# Patient Record
Sex: FEMALE | Race: WHITE | Employment: FULL TIME | URBAN - METROPOLITAN AREA
[De-identification: names, ages, dates, MRNs, and addresses within clinical notes are randomized per-mention and may not be internally consistent; named-entity substitution may affect disease eponyms.]

---

## 2017-02-07 ENCOUNTER — APPOINTMENT (OUTPATIENT)
Dept: ULTRASOUND IMAGING | Facility: CLINIC | Age: 62
End: 2017-02-07
Attending: EMERGENCY MEDICINE
Payer: MEDICAID

## 2017-02-07 ENCOUNTER — HOSPITAL ENCOUNTER (EMERGENCY)
Facility: CLINIC | Age: 62
Discharge: HOME | End: 2017-02-07
Attending: EMERGENCY MEDICINE | Admitting: EMERGENCY MEDICINE
Payer: MEDICAID

## 2017-02-07 ENCOUNTER — APPOINTMENT (OUTPATIENT)
Dept: GENERAL RADIOLOGY | Facility: CLINIC | Age: 62
End: 2017-02-07
Attending: EMERGENCY MEDICINE
Payer: MEDICAID

## 2017-02-07 VITALS
RESPIRATION RATE: 14 BRPM | DIASTOLIC BLOOD PRESSURE: 125 MMHG | WEIGHT: 218 LBS | BODY MASS INDEX: 36.32 KG/M2 | HEART RATE: 68 BPM | OXYGEN SATURATION: 97 % | HEIGHT: 65 IN | SYSTOLIC BLOOD PRESSURE: 160 MMHG | TEMPERATURE: 98.2 F

## 2017-02-07 DIAGNOSIS — R07.9 RIGHT-SIDED CHEST PAIN: ICD-10-CM

## 2017-02-07 PROCEDURE — 99284 EMERGENCY DEPT VISIT MOD MDM: CPT | Performed by: EMERGENCY MEDICINE

## 2017-02-07 PROCEDURE — 71010 XR CHEST 1 VW: CPT | Mod: TC

## 2017-02-07 PROCEDURE — 76705 ECHO EXAM OF ABDOMEN: CPT

## 2017-02-07 PROCEDURE — 71101 X-RAY EXAM UNILAT RIBS/CHEST: CPT | Mod: TC,RT

## 2017-02-07 PROCEDURE — 99284 EMERGENCY DEPT VISIT MOD MDM: CPT | Mod: 25

## 2017-02-07 NOTE — ED PROVIDER NOTES
"  History     Chief Complaint   Patient presents with     Rib Pain     The history is provided by the patient and medical records.     This is a 61-year-old female, history of hypothyroidism, hypertension, hyperlipidemia, GERD presenting with rib pain.      Patient notes that she 1st started feeling pain in her right anterior ib cage 5 days ago.  It initially started out as a sharp pain that was quite intermittent.  However, by Sunday, 2 days ago, patient noted increased pain with any touch. She is having difficulty with letting her boyfriend hug her because the pain increased. Today the pain is a nagging ache but increases with movement or when going over the bumps on the way to the ED. She denies any recent trauma, overuse, lifting. She has had some cold symptoms with which she describes as a dry cough and wheeze. She smokes about a half a pack per day. Cough is nonproductive. She was on antibiotics in October for similar symptoms and had to have \"2 rounds before it got better\".  Patient denies any fevers, chills, nausea, vomiting, abdominal pain,bowel or bladder symptoms.  No skin changes noted lumps or other abnormalities over the area pain.  She is not on inhalers. She has a history of Sjogren syndrome and is not on medications for it. Over the last year she has felt that her legs have been quite heavy whenever she is going up and down stairs but she does acknowledge that she has not been as active as usual.    I have reviewed the Medications, Allergies, Past Medical and Surgical History, and Social History in the Epic system.    Review of Systems   All other ROS reviewed and are negative or non-contributory except as stated in HPI.    Physical Exam   BP: (!) 160/125 mmHg  Pulse: 68  Temp: 98.2  F (36.8  C)  Resp: 14  Height: 165.1 cm (5' 5\")  Weight: 98.884 kg (218 lb)  SpO2: 97 %  Physical Exam   Constitutional: She appears well-developed and well-nourished.   healthy-appearing female sitting on the bed. " Moves about easily.   HENT:   Head: Normocephalic.   Right Ear: External ear normal.   Left Ear: External ear normal.   Nose: Nose normal.   Mouth/Throat: Oropharynx is clear and moist.   Eyes: Conjunctivae and EOM are normal. Pupils are equal, round, and reactive to light. No scleral icterus.   Neck: Normal range of motion. Neck supple.   Cardiovascular: Normal rate, regular rhythm, normal heart sounds and intact distal pulses.    Pulmonary/Chest: Effort normal and breath sounds normal.       Abdominal: Soft. There is no tenderness.   no significant right upper quadrant tenderness   Lymphadenopathy:     She has no cervical adenopathy.   Skin: She is not diaphoretic.   Vitals reviewed.      ED Course (with Medical Decision Making)    Pt seen and examined by me.  RN and EPIC notes reviewed.      Patient with Pain in the right anteriorrib cage area. There is possibly a slight pleuritic component. Differential includes musculoskeletal, pleuritic secondary to PE, pneumonia, viral illness, etc.    I discussed the differential and some of the options to the patient. She is preferring for the least invasive so we are going to start with x-ray.    X-ray does not show any evidence for pneumonia or broken ribs.Results were discussed with the patient. We had discussed gallbladder previously.  We have elected to do a right upper quadrant ultrasound.    Ultrasound shows no acute abnormalities. Results relayed to the patient. Recommend rest, ice or heat to the painful area. Ibuprofen or Aleve for pain.  I would like her to follow up with her primary care provider in the next week.  Return for significant worsening, changes or concerns.  Patient declines any further pain medications at this time. She needs to have her blood pressure recheck in clinic.   Procedures  Results for orders placed or performed during the hospital encounter of 02/07/17   XR Chest 1 View    Narrative    XR CHEST 1 VW   2/7/2017 1:28 PM     HISTORY: right  chest pain    COMPARISON: None.      Impression    IMPRESSION: Lungs are clear. No pleural fluid.    CORNELIUS SANDOVAL MD   Ribs XR, unilat 3 views + PA chest, right    Narrative    XR RIBS & CHEST RT 3VW   2/7/2017 1:28 PM     HISTORY: right pain; cough    COMPARISON: Chest x-ray from 5/29/2006.      Impression    IMPRESSION: Heart size is normal. Pulmonary vasculature is normal.  Lungs are clear. No pleural fluid. No definite rib fractures.    CORNELIUS SANDOVAL MD   Abdomen US, limited (RUQ only)    Narrative    ULTRASOUND ABDOMEN LIMITED  2/7/2017 2:44 PM    HISTORY: Right upper quadrant pain.    COMPARISON: None.    FINDINGS:  Gallbladder: Normal.    Common bile duct: Normal.    Liver: Normal.    Pancreas: Pancreatic tail is partially obscured. Visualized pancreas  is normal.    Right kidney: Normal.    Proximal aorta and inferior vena cava appear normal.      Impression    IMPRESSION:   1. Pancreas not completely visualized.  2. Otherwise unremarkable right upper quadrant ultrasound.    ADRIAN KOLB MD        Assessments & Plan     I have reviewed the findings, diagnosis, plan and need for follow up with the patient.    Discharge Medication List as of 2/7/2017  3:24 PM          Final diagnoses:   Right-sided chest pain     Disposition: Patient discharged home in stable condition. Plan as above. Return for concerns.    Note: Chart documentation done in part with Dragon Voice Recognition software. Although reviewed after completion, some word and grammatical errors may remain.     2/7/2017   [facility] EMERGENCY DEPARTMENT      Orin Zheng MD  02/07/17 2142    Orin Zheng MD  02/07/17 7116

## 2017-02-07 NOTE — ED AVS SNAPSHOT
[facility] Emergency Department    [address]     [place] [address]    Phone:  [phone]    Fax:  [phone]                                       Ashley Calhoun   MRN: [id number]    Department:  [facility] Emergency Department   Date of Visit:  2/7/2017           Patient Information     Date Of Birth          1955        Your diagnoses for this visit were:     Right-sided chest pain        You were seen by Orin Zheng MD.      Follow-up Information     Schedule an appointment as soon as possible for a visit with Sabra Gamboa PA-C.    Specialty:  Physician Assistant    Contact information:    [facility]  [address] [facility] [address]  [phone]          Discharge Instructions       The ultrasound was unremarkable for any gallbladder disease. Chest x-rays were also clear.    Please make an appointment for follow-up with her primary care provider. Okay to use ibuprofen or Tylenol if needed for pain.    Return for any worsening, changes or concerns.    I hope this resolves soon!!    Discharge References/Attachments     CHEST PAIN, NONCARDIAC  (ENGLISH)      24 Hour Appointment Hotline       To make an appointment at any [facility], call [phone] ([phone]). If you don't have a family doctor or clinic, we will help you find one. [facility] clinics are conveniently located to serve the needs of you and your family.             Review of your medicines      Our records show that you are taking the medicines listed below. If these are incorrect, please call your family doctor or clinic.        Dose / Directions Last dose taken    ASPIRIN PO   Dose:  81 mg        Take 81 mg by mouth daily   Refills:  0        FLUOXETINE HCL PO   Dose:  80 mg %        Take 80 mg % by mouth   Refills:  0        HYDROCHLOROTHIAZIDE PO   Dose:  25 mg        Take 25 mg by mouth daily   Refills:  0        IBUPROFEN PO   Dose:  600 mg        Take 600 mg  "by mouth every 4 hours as needed for moderate pain   Refills:  0        LEVOTHROID PO   Dose:  112 mcg        Take 112 mcg by mouth daily   Refills:  0        LORAZEPAM PO   Dose:  5-10 mg        Take 5-10 mg by mouth At Bedtime   Refills:  0        OMEPRAZOLE PO   Dose:  20 mg        Take 20 mg by mouth every morning   Refills:  0        PROPRANOLOL HCL PO   Dose:  40 mg        Take 40 mg by mouth 3 times daily   Refills:  0        SIMVASTATIN PO   Dose:  40 mg        Take 40 mg by mouth At Bedtime   Refills:  0        WELLBUTRIN SR PO   Dose:  150 mg        Take 150 mg by mouth 2 times daily   Refills:  0                Procedures and tests performed during your visit     Abdomen US, limited (RUQ only)    Ribs XR, unilat 3 views + PA chest, right    XR Chest 1 View      Orders Needing Specimen Collection     None      Pending Results     Date and Time Order Name Status Description    2/7/2017 1412 Abdomen US, limited (RUQ only) Preliminary             Pending Culture Results     No orders found from 2/6/2017 to 2/8/2017.            Thank you for choosing [facility]       Thank you for choosing [facility] for your care. Our goal is always to provide you with excellent care. Hearing back from our patients is one way we can continue to improve our services. Please take a few minutes to complete the written survey that you may receive in the mail after you visit with us. Thank you!        [employer] Information     [employer] lets you send messages to your doctor, view your test results, renew your prescriptions, schedule appointments and more. To sign up, go to [email or web address] . Click on \"Log in\" on the left side of the screen, which will take you to the Welcome page. Then click on \"Sign up Now\" on the right side of the page.     You will be asked to enter the access code listed below, as well as some personal information. Please follow the directions to create your username and password.     Your access code is: " [email or web address]  Expires: 2017  3:24 PM     Your access code will  in 90 days. If you need help or a new code, please call your [place] clinic or [phone].        Care EveryWhere ID     This is your Care EveryWhere ID. This could be used by other organizations to access your [place] medical records  [id number]        After Visit Summary       This is your record. Keep this with you and show to your community pharmacist(s) and doctor(s) at your next visit.

## 2017-02-07 NOTE — ED AVS SNAPSHOT
[facility] Emergency Department    [address] [place] [address]    Phone:  [phone]    Fax:  [phone]                                       Ashlye Calhoun   MRN: [id number]    Department:  [facility] Emergency Department   Date of Visit:  2/7/2017           After Visit Summary Signature Page     I have received my discharge instructions, and my questions have been answered. I have discussed any challenges I see with this plan with the nurse or doctor.    ..........................................................................................................................................  Patient/Patient Representative Signature      ..........................................................................................................................................  Patient Representative Print Name and Relationship to Patient    ..................................................               ................................................  Date                                            Time    ..........................................................................................................................................  Reviewed by Signature/Title    ...................................................              ..............................................  Date                                                            Time

## 2017-02-07 NOTE — ED NOTES
Pt here with rt anterior rib cage pain, since Thursday. Has had sharp bouts of it with when people hugged her. But now pain is there continuously and it gets worse with going over bumps or when she turns. Has had a cold. Had cold in October that took two doses of meds to clear. Gets pneumonia easily. Is a smoker. Smokes 6 cigs a day.

## 2017-02-07 NOTE — DISCHARGE INSTRUCTIONS
The ultrasound was unremarkable for any gallbladder disease. Chest x-rays were also clear.    Please make an appointment for follow-up with her primary care provider. Okay to use ibuprofen or Tylenol if needed for pain.    Return for any worsening, changes or concerns.    I hope this resolves soon!!

## 2017-03-08 ENCOUNTER — HOSPITAL ENCOUNTER (EMERGENCY)
Facility: CLINIC | Age: 62
Discharge: HOME | End: 2017-03-08
Attending: FAMILY MEDICINE | Admitting: FAMILY MEDICINE
Payer: MEDICAID

## 2017-03-08 ENCOUNTER — APPOINTMENT (OUTPATIENT)
Dept: CT IMAGING | Facility: CLINIC | Age: 62
End: 2017-03-08
Attending: FAMILY MEDICINE
Payer: MEDICAID

## 2017-03-08 VITALS
TEMPERATURE: 97.6 F | SYSTOLIC BLOOD PRESSURE: 136 MMHG | OXYGEN SATURATION: 94 % | HEART RATE: 81 BPM | DIASTOLIC BLOOD PRESSURE: 76 MMHG | RESPIRATION RATE: 16 BRPM

## 2017-03-08 DIAGNOSIS — S06.0X0A CONCUSSION WITHOUT LOSS OF CONSCIOUSNESS, INITIAL ENCOUNTER: ICD-10-CM

## 2017-03-08 DIAGNOSIS — S00.83XA FACIAL CONTUSION, INITIAL ENCOUNTER: ICD-10-CM

## 2017-03-08 PROCEDURE — 99285 EMERGENCY DEPT VISIT HI MDM: CPT | Mod: 25

## 2017-03-08 PROCEDURE — 25000128 H RX IP 250 OP 636: Performed by: FAMILY MEDICINE

## 2017-03-08 PROCEDURE — 96374 THER/PROPH/DIAG INJ IV PUSH: CPT

## 2017-03-08 PROCEDURE — 96361 HYDRATE IV INFUSION ADD-ON: CPT

## 2017-03-08 PROCEDURE — 70450 CT HEAD/BRAIN W/O DYE: CPT

## 2017-03-08 PROCEDURE — 99284 EMERGENCY DEPT VISIT MOD MDM: CPT | Performed by: FAMILY MEDICINE

## 2017-03-08 RX ORDER — ONDANSETRON 4 MG/1
4 TABLET, ORALLY DISINTEGRATING ORAL EVERY 6 HOURS PRN
Qty: 4 TABLET | Refills: 0 | Status: SHIPPED | OUTPATIENT
Start: 2017-03-08 | End: 2017-03-08

## 2017-03-08 RX ORDER — ONDANSETRON 2 MG/ML
4 INJECTION INTRAMUSCULAR; INTRAVENOUS EVERY 30 MIN PRN
Status: DISCONTINUED | OUTPATIENT
Start: 2017-03-08 | End: 2017-03-08 | Stop reason: HOSPADM

## 2017-03-08 RX ORDER — LIDOCAINE 40 MG/G
CREAM TOPICAL
Status: DISCONTINUED | OUTPATIENT
Start: 2017-03-08 | End: 2017-03-08 | Stop reason: HOSPADM

## 2017-03-08 RX ADMIN — SODIUM CHLORIDE 1000 ML: 9 INJECTION, SOLUTION INTRAVENOUS at 11:32

## 2017-03-08 RX ADMIN — ONDANSETRON HYDROCHLORIDE 4 MG: 2 SOLUTION INTRAMUSCULAR; INTRAVENOUS at 11:32

## 2017-03-08 NOTE — ED AVS SNAPSHOT
[facility] Emergency Department    [address] [place] [address]    Phone:  [phone]    Fax:  [phone]                                       Ashley Calhoun   MRN: [id number]    Department:  [facility] Emergency Department   Date of Visit:  3/8/2017           After Visit Summary Signature Page     I have received my discharge instructions, and my questions have been answered. I have discussed any challenges I see with this plan with the nurse or doctor.    ..........................................................................................................................................  Patient/Patient Representative Signature      ..........................................................................................................................................  Patient Representative Print Name and Relationship to Patient    ..................................................               ................................................  Date                                            Time    ..........................................................................................................................................  Reviewed by Signature/Title    ...................................................              ..............................................  Date                                                            Time

## 2017-03-08 NOTE — DISCHARGE INSTRUCTIONS
Thank you for giving us the opportunity to see you. The impression is that you had blunt left sided facial contusion, and a concussion.    You also have contusion to the left side of your shoulder and knee.    Please see the handouts below.  Ice all sore areas.    Take ibuprofen/Tylenol as needed for pain.  Add Zofran every 6 hours as needed for nausea.    Avoid any strenuous activity, and begin cognitive rest.    After discharge, please closely monitor for any new or worsening symptoms. Return to the Emergency Department at any time if your symptoms worsen.

## 2017-03-08 NOTE — ED AVS SNAPSHOT
[facility] Emergency Department    [address]     [place] [address]    Phone:  [phone]    Fax:  [phone]                                       Ashley Calhoun   MRN: [id number]    Department:  [facility] Emergency Department   Date of Visit:  3/8/2017           Patient Information     Date Of Birth          1955        Your diagnoses for this visit were:     Left facial contusion     Concussion without loss of consciousness        You were seen by Landon Villatoro MD.      Follow-up Information     Follow up with Sabra Gamboa PA-C In 1 week.    Specialty:  Physician Assistant    Contact information:    [facility]  [address] [facility] [address]  [phone]          Discharge Instructions       Thank you for giving us the opportunity to see you. The impression is that you had blunt left sided facial contusion, and a concussion.    You also have contusion to the left side of your shoulder and knee.    Please see the handouts below.  Ice all sore areas.    Take ibuprofen/Tylenol as needed for pain.  Add Zofran every 6 hours as needed for nausea.    Avoid any strenuous activity, and begin cognitive rest.    After discharge, please closely monitor for any new or worsening symptoms. Return to the Emergency Department at any time if your symptoms worsen.        24 Hour Appointment Hotline       To make an appointment at any [facility], call [phone] ([phone]). If you don't have a family doctor or clinic, we will help you find one. [facility] clinics are conveniently located to serve the needs of you and your family.             Review of your medicines      Our records show that you are taking the medicines listed below. If these are incorrect, please call your family doctor or clinic.        Dose / Directions Last dose taken    ASPIRIN PO   Dose:  81 mg        Take 81 mg by mouth daily   Refills:  0        FLUOXETINE HCL PO   Dose:  80 mg  "%        Take 80 mg % by mouth   Refills:  0        HYDROCHLOROTHIAZIDE PO   Dose:  25 mg        Take 25 mg by mouth daily   Refills:  0        IBUPROFEN PO   Dose:  600 mg        Take 600 mg by mouth every 4 hours as needed for moderate pain   Refills:  0        LEVOTHROID PO   Dose:  112 mcg        Take 112 mcg by mouth daily   Refills:  0        LORAZEPAM PO   Dose:  5-10 mg        Take 5-10 mg by mouth At Bedtime   Refills:  0        OMEPRAZOLE PO   Dose:  20 mg        Take 20 mg by mouth every morning   Refills:  0        PROPRANOLOL HCL PO   Dose:  40 mg        Take 40 mg by mouth 3 times daily   Refills:  0        SIMVASTATIN PO   Dose:  40 mg        Take 40 mg by mouth At Bedtime   Refills:  0        WELLBUTRIN SR PO   Dose:  150 mg        Take 150 mg by mouth 2 times daily   Refills:  0                Procedures and tests performed during your visit     Head CT w/o contrast    Peripheral IV catheter      Orders Needing Specimen Collection     None      Pending Results     Date and Time Order Name Status Description    3/8/2017 1042 Head CT w/o contrast Preliminary             Pending Culture Results     No orders found from 3/6/2017 to 3/9/2017.            Thank you for choosing [facility]       Thank you for choosing [facility] for your care. Our goal is always to provide you with excellent care. Hearing back from our patients is one way we can continue to improve our services. Please take a few minutes to complete the written survey that you may receive in the mail after you visit with us. Thank you!        [employer] Information     [employer] lets you send messages to your doctor, view your test results, renew your prescriptions, schedule appointments and more. To sign up, go to [email or web address] . Click on \"Log in\" on the left side of the screen, which will take you to the Welcome page. Then click on \"Sign up Now\" on the right side of the page.     You will be asked to enter the access code listed below, " as well as some personal information. Please follow the directions to create your username and password.     Your access code is: [email or web address]  Expires: 2017  3:24 PM     Your access code will  in 90 days. If you need help or a new code, please call your [place] clinic or [phone].        Care EveryWhere ID     This is your Care EveryWhere ID. This could be used by other organizations to access your [place] medical records  [id number]        After Visit Summary       This is your record. Keep this with you and show to your community pharmacist(s) and doctor(s) at your next visit.

## 2017-03-08 NOTE — ED PROVIDER NOTES
[facility] ED Provider Note   CC:     Chief Complaint   Patient presents with     Fall     HPI:  Ashley Calhoun is a 61 year old female who presented to the emergency department with acute injury to the left side of her face that occurred about 8:30 this morning.  Patient had just taken her dog for a walk to the mailbox.  She was trying to household her dog back to the house, and she slipped and fell.  She struck the left side of her face 1st and then the left shoulder and left knee.  She had no loss of consciousness, but states that this was the hardest that she has never fallen.  She has diffuse pain over the left side of her body, but denies any significant headache, neck pain, numbness, tingling, chest pain, shortness of breath.  She has been nauseated since this happened and she is feeling extremely sleepy and groggy.  Patient was dropped off by friends.  She lives with her best friend and her .  Patient denies any prior history of concussion.  She has no visual disturbance, ringing in the ears, drainage, dental trauma, nosebleed or nasal drainage, etc.    Problem List:  There are no active problems to display for this patient.      MEDS:   Previous Medications    ASPIRIN PO    Take 81 mg by mouth daily    BUPROPION HCL (WELLBUTRIN SR PO)    Take 150 mg by mouth 2 times daily    FLUOXETINE HCL PO    Take 80 mg % by mouth    HYDROCHLOROTHIAZIDE PO    Take 25 mg by mouth daily    IBUPROFEN PO    Take 600 mg by mouth every 4 hours as needed for moderate pain    LEVOTHYROXINE SODIUM (LEVOTHROID PO)    Take 112 mcg by mouth daily    LORAZEPAM PO    Take 5-10 mg by mouth At Bedtime    OMEPRAZOLE PO    Take 20 mg by mouth every morning    PROPRANOLOL HCL PO    Take 40 mg by mouth 3 times daily    SIMVASTATIN PO    Take 40 mg by mouth At Bedtime       ALLERGIES:  No Known Allergies    Past medical, surgical, and social histories, triage and  nursing notes were all reviewed.    Review of Systems   All other systems were reviewed and are negative    Physical Exam     Vitals were reviewed  Patient Vitals for the past 8 hrs:   BP Temp Temp src Pulse Resp SpO2   03/08/17 1027 126/85 98.1  F (36.7  C) Temporal 60 12 97 %     GENERAL APPEARANCE: Alert and oriented ×3.  Mild to moderate distress due to reported dizziness.  GCS 15  FACE: Left sided facial abrasion and contusion.  No crepitus or instability; normal TMJ and normal occlusion of the teeth.  EYES: Pupils are equal; extraocular muscles are intact  HENT: normal external exam  NECK: no adenopathy or asymmetry  RESP: normal respiratory effort; clear breath sounds bilaterally  CV: regular rate and rhythm; no significant murmurs, gallops or rubs  ABD: soft, no tenderness; no rebound or guarding; bowel sounds are normal  MS: no gross deformities noted; normal muscle tone.  EXT: No calf tenderness or pitting edema  SKIN: no worrisome rash  NEURO: no facial droop; no focal deficits, speech is normal        Available Lab/Imaging Results     Results for orders placed or performed during the hospital encounter of 03/08/17 (from the past 24 hour(s))   Head CT w/o contrast    Narrative    CT SCAN OF THE HEAD WITHOUT CONTRAST   3/8/2017 11:25 AM     HISTORY:  Left facial injury; fatigue, weakness, nausea.    TECHNIQUE:  Axial images of the head and coronal reformations without  IV contrast material.  Radiation dose for this scan was reduced using  automated exposure control, adjustment of the mA and/or kV according  to patient size, or iterative reconstruction technique.    COMPARISON: None.    FINDINGS:  The ventricles are normal in size, shape and configuration.   The brain parenchyma and subarachnoid spaces are normal.  There is no  evidence of intracranial hemorrhage, mass, acute infarct or anomaly.     The visualized portions of the sinuses and mastoids appear normal.   There is no evidence of trauma.       Impression    IMPRESSION:  Normal CT scan of the head.         Impression     Final diagnoses:   Left facial contusion   Concussion without loss of consciousness       ED Course & Medical Decision Making   Ashley Calhoun is a 61 year old female who presented to the emergency department with acute blunt facial trauma to the left side of the face, associated with dizziness, mild headache, nausea, and difficulty staying awake.  Patient reported to the injury occurred 2 hours prior to arrival.  The patient is a normal neurologic exam.  Patient has a abrasion/contusion to the left side of face but no other abnormal findings.  Because of her symptoms, patient underwent a CT scan of the head and this reveals no evidence for intracranial hemorrhage, mass, or acute infarct.  Patient was given IV fluids and Zofran with improvement in her symptoms.  Patient likely has a concussion and she was advised to begin cognitive rest.  Drink plenty of fluids and rest.  Avoid activities that could result in another head injury.  Continue Zofran as needed for nausea.  Follow up in the clinic in 5-7 days for recheck.  Return to the ED at any time if symptoms worsen.  Ice all sore areas      Written after-visit summary and instructions were given at the time of discharge.      New Prescriptions    No medications on file         This note was completed in part using Dragon voice recognition, and may contain word and grammatical errors.        Landon Villatoro MD  03/08/17 4738

## 2017-03-08 NOTE — ED NOTES
Pt fell outside on sidewalk this AM on to her left face and now complaints of dizziness, and headache and sleepiness. Denies that she lost consciousness.

## 2017-04-10 ENCOUNTER — TRANSFERRED RECORDS (OUTPATIENT)
Dept: HEALTH INFORMATION MANAGEMENT | Facility: CLINIC | Age: 62
End: 2017-04-10

## 2017-04-17 ENCOUNTER — TRANSFERRED RECORDS (OUTPATIENT)
Dept: HEALTH INFORMATION MANAGEMENT | Facility: CLINIC | Age: 62
End: 2017-04-17

## 2017-05-03 ENCOUNTER — HOSPITAL ENCOUNTER (OUTPATIENT)
Dept: MRI IMAGING | Facility: CLINIC | Age: 62
End: 2017-05-03
Attending: PSYCHIATRY & NEUROLOGY
Payer: MEDICARE

## 2017-05-03 ENCOUNTER — HOSPITAL ENCOUNTER (OUTPATIENT)
Dept: MRI IMAGING | Facility: CLINIC | Age: 62
Discharge: HOME | End: 2017-05-03
Attending: PSYCHIATRY & NEUROLOGY | Admitting: PSYCHIATRY & NEUROLOGY
Payer: MEDICARE

## 2017-05-03 DIAGNOSIS — R93.89 ABNORMAL FINDING ON IMAGING: ICD-10-CM

## 2017-05-03 DIAGNOSIS — R29.2 HYPERREFLEXIA: ICD-10-CM

## 2017-05-03 DIAGNOSIS — R26.9 GAIT DIFFICULTY: ICD-10-CM

## 2017-05-03 PROCEDURE — 72158 MRI LUMBAR SPINE W/O & W/DYE: CPT

## 2017-05-03 PROCEDURE — A9585 GADOBUTROL INJECTION: HCPCS | Performed by: RADIOLOGY

## 2017-05-03 PROCEDURE — 70553 MRI BRAIN STEM W/O & W/DYE: CPT

## 2017-05-03 PROCEDURE — 25500064 ZZH RX 255 OP 636: Performed by: RADIOLOGY

## 2017-05-03 RX ORDER — GADOBUTROL 604.72 MG/ML
10 INJECTION INTRAVENOUS ONCE
Status: COMPLETED | OUTPATIENT
Start: 2017-05-03 | End: 2017-05-03

## 2017-05-03 RX ADMIN — GADOBUTROL 10 ML: 604.72 INJECTION INTRAVENOUS at 10:33

## 2017-05-05 ENCOUNTER — HOSPITAL ENCOUNTER (EMERGENCY)
Facility: CLINIC | Age: 62
Discharge: HOME | End: 2017-05-05
Attending: FAMILY MEDICINE | Admitting: FAMILY MEDICINE
Payer: MEDICARE

## 2017-05-05 ENCOUNTER — APPOINTMENT (OUTPATIENT)
Dept: CT IMAGING | Facility: CLINIC | Age: 62
End: 2017-05-05
Attending: FAMILY MEDICINE
Payer: MEDICARE

## 2017-05-05 VITALS
DIASTOLIC BLOOD PRESSURE: 49 MMHG | WEIGHT: 215 LBS | HEART RATE: 58 BPM | OXYGEN SATURATION: 97 % | TEMPERATURE: 98.6 F | RESPIRATION RATE: 18 BRPM | SYSTOLIC BLOOD PRESSURE: 114 MMHG | BODY MASS INDEX: 35.82 KG/M2 | HEIGHT: 65 IN

## 2017-05-05 DIAGNOSIS — M25.552 HIP PAIN, LEFT: Primary | ICD-10-CM

## 2017-05-05 PROCEDURE — 99284 EMERGENCY DEPT VISIT MOD MDM: CPT | Mod: 25 | Performed by: FAMILY MEDICINE

## 2017-05-05 PROCEDURE — 96372 THER/PROPH/DIAG INJ SC/IM: CPT

## 2017-05-05 PROCEDURE — 72192 CT PELVIS W/O DYE: CPT

## 2017-05-05 PROCEDURE — 99284 EMERGENCY DEPT VISIT MOD MDM: CPT | Mod: 25

## 2017-05-05 PROCEDURE — 25000128 H RX IP 250 OP 636: Performed by: FAMILY MEDICINE

## 2017-05-05 RX ORDER — OXYCODONE HYDROCHLORIDE 5 MG/1
5 TABLET ORAL EVERY 6 HOURS PRN
Qty: 20 TABLET | Refills: 0 | Status: SHIPPED | OUTPATIENT
Start: 2017-05-05

## 2017-05-05 RX ORDER — OXYCODONE HYDROCHLORIDE 5 MG/1
5 TABLET ORAL EVERY 6 HOURS PRN
Qty: 10 TABLET | Refills: 0 | Status: SHIPPED | OUTPATIENT
Start: 2017-05-05

## 2017-05-05 RX ADMIN — HYDROMORPHONE HYDROCHLORIDE 1 MG: 1 INJECTION, SOLUTION INTRAMUSCULAR; INTRAVENOUS; SUBCUTANEOUS at 11:33

## 2017-05-05 ASSESSMENT — ENCOUNTER SYMPTOMS
WEAKNESS: 0
CHILLS: 0
NAUSEA: 0
DIARRHEA: 0
CONSTIPATION: 0
DIZZINESS: 0
COUGH: 0
WOUND: 0
LIGHT-HEADEDNESS: 0
SHORTNESS OF BREATH: 0
BACK PAIN: 0
ARTHRALGIAS: 1
FEVER: 0
ACTIVITY CHANGE: 1
VOMITING: 0
DIFFICULTY URINATING: 0
MYALGIAS: 0
JOINT SWELLING: 0

## 2017-05-05 NOTE — ED AVS SNAPSHOT
[facility] Emergency Department    [address]     [place] [address]    Phone:  [phone]    Fax:  [phone]                                       Ashley Calhoun   MRN: [id number]    Department:  [facility] Emergency Department   Date of Visit:  5/5/2017           Patient Information     Date Of Birth          1955        Your diagnoses for this visit were:     Hip pain, left        You were seen by Kasi Grullon MD.      Follow-up Information     Schedule an appointment as soon as possible for a visit with Sabra Gamboa PA-C.    Specialty:  Physician Assistant    Why:  As needed    Contact information:    [facility]  [address] [facility] [address]  [phone]          Discharge Instructions         Left Hip Pain    Arthralgia is the term for pain in or around the joint. It is a symptom, not a disease. This pain may involve one or more joints. In some cases, the pain moves from joint to joint.  There are many causes for joint pain. These include:    Injury    Osteoarthritis (wearing out of the joint surface)    Gout (inflammation of the joint due to crystals in the joint fluid)    Infection inside the joint      Bursitis (inflammation of the fluid-filled sacs around the joint)    Autoimmune disorders such as rheumatoid arthritis or lupus    Tendonitis (inflamation of chords that attach muscle to bone)  Home care    Rest the involved joint(s) until your symptoms improve.     I will send you home with a prescription for oxycodone. Please use this carefully and do not drive for six hours after taking this medicine.  Follow up  Follow up with Radiology for an injection of the left hip. You can call them at [phone]  Follow up with Physical Therapy as well. You can talk to them at your next appointment, and let them know that there is a referral in the computer.  When to seek medical care  Contact your healthcare provider right away if any of the  following occurs:    Pain, swelling, or redness of joint increases    Pain worsens or recurs after a period of improvement    Pain moves to other joints    You cannot bear weight on the affected joint     You cannot move the affected joint    Joint appears deformed    New rash appears    Fever of 101 F (38.8 C) or higher, or as directed by your healthcare provider      Thank you for choosing our Emergency Department for your care.     Sincerely,    Dr Max Grullon M.D.          Future Appointments        Provider Department Dept Phone Center    5/8/2017 10:50 AM Atif Nobles PA-C [facility] [phone] [facility]    5/17/2017  1:30 PM Linda Ervin OT [facility] Occupational Therapy [phone] [facility]    5/17/2017  2:30 PM Lucero Carpio, PT [facility] Physical Therapy [phone] [facility]      24 Hour Appointment Hotline       To make an appointment at any [facility]3], call [phone] ([phone]). If you don't have a family doctor or clinic, we will help you find one. [facility] are conveniently located to serve the needs of you and your family.          ED Discharge Orders     IR REFERRAL       [facility] IR REFERRAL  Call [phone] to schedule.            PHYSICAL THERAPY REFERRAL       *This therapy referral will be filtered to a centralized scheduling office at [facility] and the patient will receive a call to schedule an appointment at a [place] location most convenient for them. *     [facility] provides Physical Therapy evaluation and treatment and many specialty services across the [place] system.  If requesting a specialty program, please choose from the list below.    If you have not heard from the scheduling office within 2 business days, please call [phone] for all locations, with the exception of [place], please call [phone].  Treatment: Evaluation & Treatment  Special  Instructions/Modalities: left hip pain  Special Programs: None    Please be aware that coverage of these services is subject to the terms and limitations of your health insurance plan.  Call member services at your health plan with any benefit or coverage questions.      **Note to Provider:  If you are referring outside of [place] for the therapy appointment, please list the name of the location in the  special instructions  above, print the referral and give to the patient to schedule the appointment.                     Review of your medicines      START taking        Dose / Directions Last dose taken    oxyCODONE 5 MG IR tablet   Commonly known as:  ROXICODONE   Dose:  5 mg   Quantity:  20 tablet        Take 1 tablet (5 mg) by mouth every 6 hours as needed for pain   Refills:  0          Our records show that you are taking the medicines listed below. If these are incorrect, please call your family doctor or clinic.        Dose / Directions Last dose taken    ASPIRIN PO   Dose:  81 mg        Take 81 mg by mouth daily   Refills:  0        FLUOXETINE HCL PO   Dose:  80 mg %        Take 80 mg % by mouth   Refills:  0        HYDROCHLOROTHIAZIDE PO   Dose:  25 mg        Take 25 mg by mouth daily   Refills:  0        IBUPROFEN PO   Dose:  600 mg        Take 600 mg by mouth every 4 hours as needed for moderate pain   Refills:  0        LEVOTHROID PO   Dose:  112 mcg        Take 112 mcg by mouth daily   Refills:  0        LORAZEPAM PO   Dose:  5-10 mg        Take 5-10 mg by mouth At Bedtime   Refills:  0        OMEPRAZOLE PO   Dose:  20 mg        Take 20 mg by mouth every morning   Refills:  0        PROPRANOLOL HCL PO   Dose:  40 mg        Take 40 mg by mouth 3 times daily   Refills:  0        SIMVASTATIN PO   Dose:  40 mg        Take 40 mg by mouth At Bedtime   Refills:  0        WELLBUTRIN SR PO   Dose:  150 mg        Take 150 mg by mouth 2 times daily   Refills:  0                Prescriptions were sent or printed  "at these locations (1 Prescription)                   [id number] [place] [place] [facility] [address]    [address] , [place] [address]    Telephone:  [phone]   Fax:  [phone]   Hours:                  Printed at Department/Unit printer (1 of 1)         oxyCODONE (ROXICODONE) 5 MG IR tablet                Procedures and tests performed during your visit     CT Pelvis w/o Contrast      Orders Needing Specimen Collection     None      Pending Results     No orders found from 5/3/2017 to 5/6/2017.            Pending Culture Results     No orders found from 5/3/2017 to 5/6/2017.            Pending Results Instructions     If you had any lab results that were not finalized at the time of your Discharge, you can call the ED Lab Result RN at [phone]. You will be contacted by this team for any positive Lab results or changes in treatment. The nurses are available 7 days a week from 10A to 6:30P.  You can leave a message 24 hours per day and they will return your call.        Thank you for choosing [facility]       Thank you for choosing [facility] for your care. Our goal is always to provide you with excellent care. Hearing back from our patients is one way we can continue to improve our services. Please take a few minutes to complete the written survey that you may receive in the mail after you visit with us. Thank you!        [employer] Information     [employer] lets you send messages to your doctor, view your test results, renew your prescriptions, schedule appointments and more. To sign up, go to [email or web address] . Click on \"Log in\" on the left side of the screen, which will take you to the Welcome page. Then click on \"Sign up Now\" on the right side of the page.     You will be asked to enter the access code listed below, as well as some personal information. Please follow the directions to create your username and password.     Your access code is: [email or web address]  Expires: 5/8/2017  4:24 PM   "   Your access code will  in 90 days. If you need help or a new code, please call your [facility] clinic or [phone].        Care EveryWhere ID     This is your Care EveryWhere ID. This could be used by other organizations to access your [facility] medical records  [id number]        After Visit Summary       This is your record. Keep this with you and show to your community pharmacist(s) and doctor(s) at your next visit.

## 2017-05-05 NOTE — PROGRESS NOTES
Pt requested to get up to the bathroom.  She was able to walk to the bathroom and void.  However, when trying to get back from the bathroom patient had excruciating pain.  Pt put on yonny steady and was moved back to her ED cart on that. Once she was back in bed the pain did decrease some.  Pt is requesting pain meds. Doctor informed.

## 2017-05-05 NOTE — ED AVS SNAPSHOT
[facility] Emergency Department    [address] [place] [address]    Phone:  [phone]    Fax:  [phone]                                       Ashley Calhoun   MRN: [id number]    Department:  [facility] Emergency Department   Date of Visit:  5/5/2017           After Visit Summary Signature Page     I have received my discharge instructions, and my questions have been answered. I have discussed any challenges I see with this plan with the nurse or doctor.    ..........................................................................................................................................  Patient/Patient Representative Signature      ..........................................................................................................................................  Patient Representative Print Name and Relationship to Patient    ..................................................               ................................................  Date                                            Time    ..........................................................................................................................................  Reviewed by Signature/Title    ...................................................              ..............................................  Date                                                            Time

## 2017-05-05 NOTE — ED NOTES
Pt comes in with complaints of L groin and L back pain that started a couple weeks ago. Pt denies trauma/injury. Pt also states a couple weeks ago she noticed a large hematoma on her L side from unknown cause.

## 2017-05-05 NOTE — DISCHARGE INSTRUCTIONS
Left Hip Pain    Arthralgia is the term for pain in or around the joint. It is a symptom, not a disease. This pain may involve one or more joints. In some cases, the pain moves from joint to joint.  There are many causes for joint pain. These include:    Injury    Osteoarthritis (wearing out of the joint surface)    Gout (inflammation of the joint due to crystals in the joint fluid)    Infection inside the joint      Bursitis (inflammation of the fluid-filled sacs around the joint)    Autoimmune disorders such as rheumatoid arthritis or lupus    Tendonitis (inflamation of chords that attach muscle to bone)  Home care    Rest the involved joint(s) until your symptoms improve.     I will send you home with a prescription for oxycodone. Please use this carefully and do not drive for six hours after taking this medicine.  Follow up  Follow up with Radiology for an injection of the left hip. You can call them at [phone]  Follow up with Physical Therapy as well. You can talk to them at your next appointment, and let them know that there is a referral in the computer.  When to seek medical care  Contact your healthcare provider right away if any of the following occurs:    Pain, swelling, or redness of joint increases    Pain worsens or recurs after a period of improvement    Pain moves to other joints    You cannot bear weight on the affected joint     You cannot move the affected joint    Joint appears deformed    New rash appears    Fever of 101 F (38.8 C) or higher, or as directed by your healthcare provider      Thank you for choosing our Emergency Department for your care.     Sincerely,    Dr Max Grullon M.D.

## 2017-05-05 NOTE — ED PROVIDER NOTES
History     Chief Complaint   Patient presents with     Back Pain     The history is provided by the patient and a significant other.     Ashley Calhoun is a 61 year old female who iis here with left groin pain.  She's had this pain for quite a while, since a fall on March 8th.  She has radiation of the pain into the back of the left pelvis and had a bruise on the left side of her leg.  The pain seems to come and go.  Today there is still pain in the left side of her groin.  It seems to be made worse by bending over or by movement of her hip.    She was seen in the emergency department and treated or concussion.  She has since been to [facility] for evaluation of hyperreflexia and gait difficulty.    I have reviewed the Medications, Allergies, Past Medical and Surgical History, and Social History in the Epic system.    She was seen by [facility] April 17 and the impression was gait difficulty and hyperreflexia.  Their note was reviewed in the chart.  They ordered an MRI for her.  She had a lumbar MRI on May 3rd:  IMPRESSION:  1. Advanced degenerative disc disease throughout the lumbar spine with discogenic endplate reactive marrow edema noted at L4-L5 and L2-L3.  2. Advanced apophyseal joint degenerative arthrosis in the lower lumbar spine. This is associated with mild periarticular reactive marrow edema on the right at L4-L5.  3. Marked prominence of the epidural fat or epidural lipomatosis at and below the L4-L5 level with resultant tapering of the thecal sac.  4. Mild central stenosis is also noted at L2-L3 and L3-L4.  5. Multilevel foraminal stenosis, greatest on the left at L5-S1 and bilaterally at L4-L5.    Review of Systems   Constitutional: Positive for activity change. Negative for chills and fever.   Respiratory: Negative for cough and shortness of breath.    Gastrointestinal: Negative for constipation, diarrhea, nausea and vomiting.   Genitourinary: Negative  "for decreased urine volume and difficulty urinating.   Musculoskeletal: Positive for arthralgias and gait problem. Negative for back pain, joint swelling and myalgias.   Skin: Negative for rash and wound.   Neurological: Negative for dizziness, weakness and light-headedness.   All other systems reviewed and are negative.      Physical Exam   BP: 134/85  Pulse: 66  Temp: 98.6  F (37  C)  Resp: 16  Height: 165.1 cm (5' 5\")  Weight: 97.5 kg (215 lb)  SpO2: 99 %    Physical Exam   Constitutional: She is oriented to person, place, and time. She appears well-developed and well-nourished. She appears distressed.   HENT:   Head: Normocephalic and atraumatic.   Eyes: Conjunctivae and EOM are normal.   Neck: Normal range of motion. Neck supple.   Cardiovascular: Normal rate, regular rhythm, normal heart sounds and intact distal pulses.    No murmur heard.  Pulmonary/Chest: Effort normal and breath sounds normal. No respiratory distress. She has no wheezes. She has no rales.   Abdominal: Soft. Bowel sounds are normal. She exhibits no distension. There is no tenderness.   Palpation in the LLQ does not produce pain   Musculoskeletal:   stoney minimal movement of the left hip produces pain.  The patient has pain located right at the joint in the hip, but palpation of the area does not produce tenderness like movement of the hip.   Neurological: She is alert and oriented to person, place, and time.   Skin: Skin is warm and dry. No rash noted. No erythema.   Psychiatric: She has a normal mood and affect. Her behavior is normal.   Nursing note and vitals reviewed.      ED Course     ED Course     Procedures    Results for orders placed or performed during the hospital encounter of 05/05/17 (from the past 24 hour(s))   CT Pelvis w/o Contrast    Narrative    CT PELVIS WITHOUT CONTRAST  5/5/2017 10:32 AM     HISTORY: Left hip and pelvis pain.  History of fall.    TECHNIQUE:  Axial images with reconstructions. No IV contrast.  Radiation " dose for this scan was reduced using automated exposure  control, adjustment of the mA and/or kV according to patient size, or  iterative reconstruction technique.    COMPARISON:  None    FINDINGS:  Degenerative change of the lower lumbar spine. Left femoral  neck synovial herniation pit. These have been described as a normal  variant, but also in association with femoroacetabular impingement.  Small areas of calcification or ossification are seen at the iliopsoas  tendon insertion bilaterally, presumably related to chronic repetitive  stress. There is some posterior hip joint space narrowing bilaterally.      Impression    IMPRESSION:  1. No fracture identified.  2. Additional findings discussed above.    AINSLEY SANTOS MD       Medications   HYDROmorphone (DILAUDID) injection 1 mg (1 mg Intramuscular Given 5/5/17 7697)         Assessments & Plan (with Medical Decision Making)  Ashley came to the ER with ongoing left sided hip pain.  She was recently seen by neurology for gait problems and hyperreflexia.  They did an MRI of the lumbar spine which wasrelatively unremarkable as noted above.  They did not have specific interventional plans for this patient.  Exam show pain of the left hip with any movement of the hip.  She does not have tenderness with palpation of the area.  CT scan of the hip shows degenerative change of the lower lumbar spine, left femoral neck synovial herniation and implies the possibility of femoral acetabular impingement.  I spoke with Dr. Phillips about this case and she recommends joint injection by interventional radiology, physical therapy and fades.  Dr. Araujo, radiology, was kind enough to come talk to the patient about what this procedure would entail, although we are not able to schedule this today.  I gave the patient a referral for interventional radiology and for physical therapy.  I will send her home with 20 tablets of oxycodone and recommended she use ibuprofen as well.  The  patient seemed to really appreciate this planning and was discharged from the ED.     I have reviewed the nursing notes.    I have reviewed the findings, diagnosis, plan and need for follow up with the patient.    New Prescriptions    OXYCODONE (ROXICODONE) 5 MG IR TABLET    Take 1 tablet (5 mg) by mouth every 6 hours as needed for pain       Final diagnoses:   Hip pain, left       5/5/2017   [facility] EMERGENCY DEPARTMENT     Kasi Grullon MD  05/05/17 5968

## 2017-05-08 ENCOUNTER — OFFICE VISIT (OUTPATIENT)
Dept: NEUROSURGERY | Facility: CLINIC | Age: 62
End: 2017-05-08
Payer: MEDICARE

## 2017-05-08 VITALS — TEMPERATURE: 98.3 F | WEIGHT: 220.6 LBS | BODY MASS INDEX: 36.75 KG/M2 | HEIGHT: 65 IN

## 2017-05-08 DIAGNOSIS — M54.2 CERVICALGIA: Primary | ICD-10-CM

## 2017-05-08 DIAGNOSIS — F07.81 POST CONCUSSIVE SYNDROME: ICD-10-CM

## 2017-05-08 DIAGNOSIS — M54.5 ACUTE LEFT-SIDED LOW BACK PAIN, WITH SCIATICA PRESENCE UNSPECIFIED: ICD-10-CM

## 2017-05-08 PROCEDURE — 99204 OFFICE O/P NEW MOD 45 MIN: CPT | Performed by: PHYSICIAN ASSISTANT

## 2017-05-08 ASSESSMENT — PAIN SCALES - GENERAL: PAINLEVEL: MILD PAIN (2)

## 2017-05-08 NOTE — PROGRESS NOTES
"Ashley Calhoun is a 61 year old female who presents for:  Chief Complaint   Patient presents with     Neurologic Problem     neck & low back  issues        Initial Vitals:  Temp 98.3  F (36.8  C) (Temporal)  Ht 1.651 m (5' 5\")  Wt 100.1 kg (220 lb 9.6 oz)  BMI 36.71 kg/m2 Estimated body mass index is 36.71 kg/(m^2) as calculated from the following:    Height as of this encounter: 1.651 m (5' 5\").    Weight as of this encounter: 100.1 kg (220 lb 9.6 oz).. Body surface area is 2.14 meters squared. BP completed using cuff size: NA (Not Taken)  Mild Pain (2)    Do you feel safe in your environment?  Yes  Do you need any refills today? No    Nursing Comments:       Marie Mendes CMA ([facility])    "

## 2017-05-08 NOTE — MR AVS SNAPSHOT
"              After Visit Summary   5/8/2017    Ashley Calhoun    MRN: [id number]           Patient Information     Date Of Birth          1955        Visit Information        Provider Department      5/8/2017 10:50 AM Atif Nobles PA-C [facility]        Today's Diagnoses     Cervicalgia    -  1    Acute left-sided low back pain, with sciatica presence unspecified        Post concussive syndrome           Follow-ups after your visit        Your next 10 appointments already scheduled     May 17, 2017  1:30 PM CDT   Evaluation with Linda Ervin OT   [facility] ([facility])    [address] [place] [id number] [phone]            May 17, 2017  2:30 PM CDT   Evaluation with Lucero Carpio PT   [facility] ([facility])    [address] [place] [phone]              Who to contact     If you have questions or need follow up information about today's clinic visit or your schedule please contact [facility] directly at [phone].  Normal or non-critical lab and imaging results will be communicated to you by [employer], letter or phone within 4 business days after the clinic has received the results. If you do not hear from us within 7 days, please contact the clinic through [employer] or phone. If you have a critical or abnormal lab result, we will notify you by phone as soon as possible.  Submit refill requests through [employer] or call your pharmacy and they will forward the refill request to us. Please allow 3 business days for your refill to be completed.          Additional Information About Your Visit        MyChart Information     [employer] lets you send messages to your doctor, view your test results, renew your prescriptions, schedule appointments and more. To sign up, go to [email or web address] . Click on \"Log in\" on the left side of the screen, which " "will take you to the Welcome page. Then click on \"Sign up Now\" on the right side of the page.     You will be asked to enter the access code listed below, as well as some personal information. Please follow the directions to create your username and password.     Your access code is: [email or web address]  Expires: 2017  4:24 PM     Your access code will  in 90 days. If you need help or a new code, please call your [facility] or [phone].        Care EveryWhere ID     This is your Care EveryWhere ID. This could be used by other organizations to access your [facility] medical records  [id number]        Your Vitals Were     Temperature Height BMI (Body Mass Index)             98.3  F (36.8  C) (Temporal) 1.651 m (5' 5\") 36.71 kg/m2          Blood Pressure from Last 3 Encounters:   17 114/49   17 136/76   17 (!) 160/125    Weight from Last 3 Encounters:   17 100.1 kg (220 lb 9.6 oz)   17 97.5 kg (215 lb)   17 98.9 kg (218 lb)              Today, you had the following     No orders found for display       Primary Care Provider Office Phone # Fax #    Sabra Gamboa PA-C [phone]       [address] [address]        Thank you!     Thank you for choosing [facility]  for your care. Our goal is always to provide you with excellent care. Hearing back from our patients is one way we can continue to improve our services. Please take a few minutes to complete the written survey that you may receive in the mail after your visit with us. Thank you!             Your Updated Medication List - Protect others around you: Learn how to safely use, store and throw away your medicines at www.disposemymeds.org.          This list is accurate as of: 17 11:38 AM.  Always use your most recent med list.                   Brand Name Dispense Instructions for use    ASPIRIN PO      Take 81 mg by mouth daily       FLUOXETINE " HCL PO      Take 80 mg % by mouth       HYDROCHLOROTHIAZIDE PO      Take 25 mg by mouth daily       IBUPROFEN PO      Take 600 mg by mouth every 4 hours as needed for moderate pain       LEVOTHROID PO      Take 112 mcg by mouth daily       LORAZEPAM PO      Take 5-10 mg by mouth At Bedtime       OMEPRAZOLE PO      Take 20 mg by mouth every morning       * oxyCODONE 5 MG IR tablet    ROXICODONE    20 tablet    Take 1 tablet (5 mg) by mouth every 6 hours as needed for pain       * oxyCODONE 5 MG IR tablet    ROXICODONE    10 tablet    Take 1 tablet (5 mg) by mouth every 6 hours as needed for pain       PROPRANOLOL HCL PO      Take 40 mg by mouth 3 times daily       SIMVASTATIN PO      Take 40 mg by mouth At Bedtime       WELLBUTRIN SR PO      Take 150 mg by mouth 2 times daily       * Notice:  This list has 2 medication(s) that are the same as other medications prescribed for you. Read the directions carefully, and ask your doctor or other care provider to review them with you.

## 2017-05-08 NOTE — PROGRESS NOTES
Dr. Good Jain  [facility]  Neurosurgery Clinic Visit      CC: Neck and back pain    Primary care Provider: Sabra Gamboa      Reason For Visit:   I was asked to consult on the patient for neck and back pain.      HPI: Ashley Calhoun is a 61 year old female who presents for evaluation of her chief complaints of neck and back pain, as well as postconcussive syndrome. She states that she had a fall and struck her head in early April and was diagnosed with a concussion. She has had some problems with confusion and short-term recall since that time. She also experienced a flare of neck and back pain. She was referred to our office for further evaluation. She does have a recent cervical, brain, and lumbar MRI for review. She states that her neck pain has gotten a lot better, where previously she had difficulty turning her head side to side.  She describes an aching low back pain, radiating into her left hip. She is scheduled to receive an injection into her left hip tomorrow.    Past Medical History:   Diagnosis Date     Asymptomatic human immunodeficiency virus (HIV) infection status (H)        Past Medical History reviewed with patient during visit.    Past Surgical History:   Procedure Laterality Date     HC SPINAL PUNCTURE, LUMBAR DIAGNOSTIC N/A 5/20/2016    Procedure: SPINAL PUNCTURE,LUMBAR, DIAGNOSTIC;  Surgeon: GENERIC ANESTHESIA PROVIDER;  Location: [facility] OR     Past Surgical History reviewed with patient during visit.    Current Outpatient Prescriptions   Medication     oxyCODONE (ROXICODONE) 5 MG IR tablet     oxyCODONE (ROXICODONE) 5 MG IR tablet     FLUOXETINE HCL PO     PROPRANOLOL HCL PO     Levothyroxine Sodium (LEVOTHROID PO)     HYDROCHLOROTHIAZIDE PO     BuPROPion HCl (WELLBUTRIN SR PO)     LORAZEPAM PO     SIMVASTATIN PO     ASPIRIN PO     OMEPRAZOLE PO     IBUPROFEN PO     No current facility-administered medications for this visit.        No Known Allergies    Social History  "    Social History     Marital status:      Spouse name: N/A     Number of children: N/A     Years of education: N/A     Social History Main Topics     Smoking status: Current Every Day Smoker     Packs/day: 0.50     Years: 50.00     Smokeless tobacco: Never Used     Alcohol use Yes      Comment: rare     Drug use: No     Sexual activity: Not Asked     Other Topics Concern     None     Social History Narrative       No family history on file.       ROS: 10 point ROS neg other than the symptoms noted above in the HPI.    Vital Signs: Temp 98.3  F (36.8  C) (Temporal)  Ht 1.651 m (5' 5\")  Wt 100.1 kg (220 lb 9.6 oz)  BMI 36.71 kg/m2    Examination:  Constitutional:  Alert, well nourished, NAD.  HEENT: Normocephalic, atraumatic.   Pulmonary:  Without shortness of breath, normal effort.   Lymph: no lymphadenopathy to low back or LE.   Integumentary: Skin is free of rashes or lesions.   Cardiovascular:  No pitting edema of BLE.      Neurological:  Awake  Alert  Oriented x 3, but she does describe recently she has been more confused and has had some struggles with recall. She is very appropriate for me in the office today.  Speech clear  Cranial nerves II - XII grossly intact  PERRL  EOMI  Face symmetric  Tongue midline  Motor exam     Hip Flexor:                Right: 5/5  Left:  5/5  Hip Adductor:             Right:  5/5  Left:  5/5  Hip Abductor:             Right:  5/5  Left:  5/5  Gastroc Soleus:        Right:  5/5  Left:  5/5  Tib/Ant:                      Right:  5/5  Left:  5/5  EHL:                          Right:  5/5  Left:  5/5       Sensation normal to bilateral upper and lower extremities.    Reflexes are 2+ in the patellar and Achilles. There is no clonus. Downgoing Babinski.    Reflexes are 2+ in the brachial radialis and triceps. Negative Juancho sign bilaterally.      Musculoskeletal:  Gait: Able to stand from a seated position. Normal non-antalgic, non-myelopathic gait.  Able to heel/toe " walk without loss of balance  Cervical examination reveals good range of motion.  No tenderness to palpation of the cervical spine or paraspinous muscles bilaterally.    Lumbar examination reveals no tenderness of the spine or paraspinous muscles.  Hip height is symmetrical. Negative SI joint, sciatic notch or greater trochanteric tenderness to palpation bilaterally.  Straight leg raise is negative bilaterally.      Imaging:   MRI of the cervical spine was reviewed in the office today. It shows degenerative disc disease at C5 6 and C6 7, with mild bilateral foraminal narrowing.    MRI of the lumbar spine also shows multilevel disc degeneration, with a left-sided foraminal stenosis at L5-S1 due to broad-based disc bulging and facet arthropathy.    MRI of the brain is essentially unchanged from prior imaging. No acute pathology is identified.    Assessment/Plan:     Neck pain  Low back pain and left hip pain  Postconcussive syndrome    Ashley Calhoun is a 61 year old female. I did have a lengthy discussion with the patient regarding her symptoms. She understands that her brain MRI is essentially unchanged. Regarding her neck, her neck pain has improved significantly with rest. I would not recommend any further interventions. Regarding her low back and left hip pain, she does have an MRI showing some disc degeneration and foraminal stenosis on the left. She is scheduled to receive an injection into her left hip tomorrow. I did encourage her to call us if this injection does not provide symptomatic relief, and she may benefit from a left-sided transforaminal injection at L5-S1. Otherwise, we will see her back on an as-needed basis. She was to agreement and understanding.        Atif Nobles PA-C  [facility]  [address] [address]    Tel [phone]  Pager [phone]

## 2017-05-10 ENCOUNTER — HOSPITAL ENCOUNTER (OUTPATIENT)
Dept: OCCUPATIONAL THERAPY | Facility: CLINIC | Age: 62
Setting detail: THERAPIES SERIES
End: 2017-05-10
Attending: PSYCHIATRY & NEUROLOGY
Payer: MEDICARE

## 2017-05-10 PROCEDURE — 97535 SELF CARE MNGMENT TRAINING: CPT | Mod: GO

## 2017-05-10 PROCEDURE — G8987 SELF CARE CURRENT STATUS: HCPCS | Mod: GO,CJ

## 2017-05-10 PROCEDURE — 40000768 ZZHC STATISTIC OT CONCUSSION VISIT

## 2017-05-10 PROCEDURE — G8988 SELF CARE GOAL STATUS: HCPCS | Mod: GO,CI

## 2017-05-10 PROCEDURE — 97167 OT EVAL HIGH COMPLEX 60 MIN: CPT | Mod: GO

## 2017-05-17 ENCOUNTER — HOSPITAL ENCOUNTER (OUTPATIENT)
Dept: OCCUPATIONAL THERAPY | Facility: CLINIC | Age: 62
Setting detail: THERAPIES SERIES
End: 2017-05-17
Attending: PSYCHIATRY & NEUROLOGY
Payer: MEDICARE

## 2017-05-17 ENCOUNTER — HOSPITAL ENCOUNTER (OUTPATIENT)
Dept: PHYSICAL THERAPY | Facility: CLINIC | Age: 62
Setting detail: THERAPIES SERIES
End: 2017-05-17
Attending: PSYCHIATRY & NEUROLOGY
Payer: MEDICARE

## 2017-05-17 PROCEDURE — G8979 MOBILITY GOAL STATUS: HCPCS | Mod: GP,CJ | Performed by: PHYSICAL THERAPIST

## 2017-05-17 PROCEDURE — 96125 COGNITIVE TEST BY HC PRO: CPT | Mod: GO,59

## 2017-05-17 PROCEDURE — G9170 MEMORY D/C STATUS: HCPCS | Mod: GO,CI

## 2017-05-17 PROCEDURE — G8978 MOBILITY CURRENT STATUS: HCPCS | Mod: GP,CK | Performed by: PHYSICAL THERAPIST

## 2017-05-17 PROCEDURE — 40000768 ZZHC STATISTIC OT CONCUSSION VISIT

## 2017-05-17 PROCEDURE — 97110 THERAPEUTIC EXERCISES: CPT | Mod: GP | Performed by: PHYSICAL THERAPIST

## 2017-05-17 PROCEDURE — G9169 MEMORY GOAL STATUS: HCPCS | Mod: GO,CI

## 2017-05-17 PROCEDURE — 97140 MANUAL THERAPY 1/> REGIONS: CPT | Mod: GP | Performed by: PHYSICAL THERAPIST

## 2017-05-17 PROCEDURE — 97162 PT EVAL MOD COMPLEX 30 MIN: CPT | Mod: GP | Performed by: PHYSICAL THERAPIST

## 2017-05-17 PROCEDURE — 40000718 ZZHC STATISTIC PT DEPARTMENT ORTHO VISIT: Performed by: PHYSICAL THERAPIST

## 2017-05-17 PROCEDURE — 97535 SELF CARE MNGMENT TRAINING: CPT | Mod: GO

## 2017-05-17 PROCEDURE — G9168 MEMORY CURRENT STATUS: HCPCS | Mod: GO,CI
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
## 2017-05-18 NOTE — PROGRESS NOTES
[facility]          OUTPATIENT PHYSICAL THERAPY ORTHOPEDIC EVALUATION  PLAN OF TREATMENT FOR OUTPATIENT REHABILITATION  (COMPLETE FOR INITIAL CLAIMS ONLY)  Patient's Last Name, First Name, M.I.  YOB: 1955  Ashley Calhoun    Provider s Name:  [facility]   Medical Record No.  [id number]   Start of Care Date:  05/17/17   Onset Date:  03/08/17 (fall, gait difficulty approx 10-13-15 - pt not sure)   Type:     _X__PT   ___OT   ___SLP Medical Diagnosis:  Gait difficulty, hyper reflexia, history of Sjogren's Disease, and Cognitive changes     PT Diagnosis:  Multiple areas of pain from falling, difficulty with gait and balance, neck and headache pain, R shoulder pain, low back pain   Visits from SOC:  1      _________________________________________________________________________________  Plan of Treatment/Functional Goals:     Continue assessments, manual therapy, home balance and strengthening program     as needed  Goals   1.  Complete balance, gait and low back assessment and then establish goals - next session  2.  sEtee will wear good supportive shoes and use AD so she is safe and can report  0 falls x 3 weeks        Therapy Frequency:     Predicted Duration of Therapy Intervention:  1-2 times per week based on her tolerance of PT with OT x 8 weeks    Lucero Carpio, PT                 I CERTIFY THE NEED FOR THESE SERVICES FURNISHED UNDER        THIS PLAN OF TREATMENT AND WHILE UNDER MY CARE .             Physician Signature               Date    X_____________________________________________________                             Certification Date From:  05/17/17   Certification Date To:  07/15/17    Referring Provider:  Dr. Aye Cuellar MD    Initial Assessment        See Epic Evaluation Start of Care Date: 05/17/17

## 2017-05-18 NOTE — ADDENDUM NOTE
Encounter addended by: Linda Ervin OT on: 5/18/2017  2:45 PM<BR>     Actions taken: Sign clinical note, Flowsheet accepted

## 2017-05-31 ENCOUNTER — HOSPITAL ENCOUNTER (OUTPATIENT)
Dept: OCCUPATIONAL THERAPY | Facility: CLINIC | Age: 62
Setting detail: THERAPIES SERIES
End: 2017-05-31
Attending: PSYCHIATRY & NEUROLOGY
Payer: MEDICARE

## 2017-05-31 PROCEDURE — 97535 SELF CARE MNGMENT TRAINING: CPT | Mod: GO

## 2017-05-31 PROCEDURE — 97530 THERAPEUTIC ACTIVITIES: CPT | Mod: GO

## 2017-05-31 PROCEDURE — 40000768 ZZHC STATISTIC OT CONCUSSION VISIT

## 2017-06-08 ENCOUNTER — TELEPHONE (OUTPATIENT)
Dept: PHYSICAL THERAPY | Facility: CLINIC | Age: 62
End: 2017-06-08

## 2017-06-08 NOTE — ADDENDUM NOTE
Encounter addended by: Lucero Carpio PT on: 6/8/2017  9:34 AM<BR>     Actions taken: Sign clinical note

## 2017-06-08 NOTE — DISCHARGE SUMMARY
Outpatient Physical Therapy Discharge Note     Patient: Ashley Calhoun  : 1955    Beginning/End Dates of Reporting Period:  One visits on 17 for initial evaluation    Referring Provider: Dr. Aye Cuellar MD    Therapy Diagnosis:  Multiple areas of pain from falling, difficulty with gait and balance, neck and headache pain, R shoulder pain, low back pain    Client Self Report:Called patient today as she missed 2 appts. She stated she canceled all of her rehabilitation appts as she is taking care of her grandchildren this summer as the  people were not available. She reports her shoulder pain is on and off. She will get a new order from the doctor when she is ready to return.     Objective Measurements:  Please see initial evaluation    Goals:  Specific goals were to be addressed at her second session. She did not continue with PT after the initial evaluation.       Progress Toward Goals:   Not assessed this period.    Plan:  Discharge from therapy.    Discharge:    Reason for Discharge: Patient chooses to discontinue therapy.    Equipment Issued: none    Discharge Plan: will call when she is ready to return.     Thank you for referring Estee  to [facility] [place]    Lucero Carpio, PT  [phone]

## 2017-06-26 NOTE — ADDENDUM NOTE
Encounter addended by: Lucero Carpio PT on: 6/26/2017  3:57 PM<BR>     Actions taken: Flowsheet accepted, Charge Capture section accepted

## 2017-06-27 NOTE — ADDENDUM NOTE
Encounter addended by: Linda Ervin, OT on: 6/27/2017  4:10 PM<BR>     Actions taken: Flowsheet accepted, Charge Capture section accepted

## 2017-06-27 NOTE — ADDENDUM NOTE
Encounter addended by: Linda Ervin, OT on: 6/27/2017  4:05 PM<BR>     Actions taken: Flowsheet accepted, Charge Capture section accepted

## 2017-07-10 NOTE — PROGRESS NOTES
"Outpatient Occupational Therapy Discharge Note     Patient: Wolfgang Calhoun  : 1955  Insurance:   Payor/Plan Subscriber Name Rel Member # Group #   MEDICARE - MEDICARE [employer]  [id number]       ATTN CLAIMS, PO    [place] [employer]  [id number]       [address] [address], [address]       Beginning/End Dates of Reporting Period:  5/10/2017 to 2017    Referring Provider: Dr. Cuellar    Therapy Diagnosis: inability to engage in IADL's due to imapirments in cognition and persistent concussion symptoms    Client Self Report: Pt. reports feeling better, doesn't feel as \"spacey\" and her headaches are better. She feels her headaches occur with neck and shoulder pain. She noted that she was cooking before she came and was able to follow two recipes without getting confused. She identifies shopping as her biggest challenge.    Goals:   Patient did not participate in assessment of progress.     Plan:  Discharge from therapy.    Discharge:    Reason for Discharge: Patient chooses to discontinue therapy.  Patient stopped showing up to appointments.  Attempted to contact via phone with no response.  PT was able to connect and patient reported she was going to be watching [employer] this summer and not able to attend therapy appointments.    Equipment Issued: none    Discharge Plan: Please reconsider referral if patient returns with concerns about about complete BADL/IADL again.   "

## 2017-07-10 NOTE — ADDENDUM NOTE
Encounter addended by: Linda Ervin OT on: 7/10/2017  1:48 AM<BR>     Actions taken: Pend clinical note, Flowsheet accepted, Sign clinical note, Episode resolved